# Patient Record
Sex: FEMALE | Race: WHITE
[De-identification: names, ages, dates, MRNs, and addresses within clinical notes are randomized per-mention and may not be internally consistent; named-entity substitution may affect disease eponyms.]

---

## 2018-12-21 ENCOUNTER — HOSPITAL ENCOUNTER (EMERGENCY)
Dept: HOSPITAL 80 - FED | Age: 43
LOS: 1 days | Discharge: HOME | End: 2018-12-22
Payer: COMMERCIAL
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
## 2018-12-21 DIAGNOSIS — S86.812A: Primary | ICD-10-CM
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
## 2018-12-21 PROCEDURE — L4386 NON-PNEUM WALK BOOT PRE CST: HCPCS

## 2018-12-22 VITALS — SYSTOLIC BLOOD PRESSURE: 134 MMHG | DIASTOLIC BLOOD PRESSURE: 96 MMHG

## 2018-12-22 NOTE — EDPHY
H & P


Stated Complaint: 2000: COLLISION WITH OTHER PLAYERS, INCREASING SWELLING/PAIN 

LEFT CALF





- Personal History


LMP (Females 10-55): Hysterectomy


Current Tetanus/Diphtheria Vaccine: Yes





- Medical/Surgical History


Hx Asthma: Yes


Hx Chronic Respiratory Disease: No


Other PMH: HYSTERECTOMY, ENDOMETRIOSIS, CILLIAC , ASTHMA





- Social History


Smoking Status: Never smoked


Time Seen by Provider: 12/21/18 23:34


HPI/ROS: 





Chief complaint:  Left calf pain





History of present illness: This is a 43-year-old female who presents to the 

emergency department for left calf pain.  Patient was playing soccer, she 

collided with another player.  She has developed pain and swelling in the left 

calf at the site of the collision. It makes it difficult to ambulate but she 

still can ambulate. She states she is moving her ankle well.  No report of open 

wounds.  No abnormal coolness or paresthesias in the leg.  No other trauma 

reported. ([place])





- Physical Exam


Exam: 





General:  Alert, nontoxic.


Skin:  No open wounds to the left lower extremity.


Musculoskeletal:  There is mild tenderness to the mid belly of the left calf 

region.  Associated swelling.  However compartments remain soft.  She can move 

all digits in the foot.  She is moving the ankle in all fields well.  The 

Achilles feels intact.  The knee is nontender and she is moving it well.


Vascular:  DP and PT pulses 2+.


Neurologic:  Sensation intact throughout the left lower extremity. ([place] Puente)


Constitutional: 





 Initial Vital Signs











Temperature (C)  36.6 C   12/21/18 23:20


 


Heart Rate  61   12/21/18 23:20


 


Respiratory Rate  18   12/21/18 23:20


 


Blood Pressure  149/62 H  12/21/18 23:20


 


O2 Sat (%)  96   12/21/18 23:20








 











O2 Delivery Mode               Room Air














Allergies/Adverse Reactions: 


 





Sulfa (Sulfonamide Antibiotics) Allergy (Verified 12/21/18 23:19)


 








Home Medications: 














 Medication  Instructions  Recorded


 


Lisinopril  12/21/18














Medical Decision Making





- Diagnostics


Imaging: I viewed and interpreted images myself


ED Course/Re-evaluation: 





Patient seen under the supervision of my secondary supervising physician Dr. Camila Rizzo.  Patient presents for a left calf injury.  She has good 

musculoskeletal control of the knee and ankle.  The leg is neurovascularly 

intact.  The Achilles does appear intact.  Pain and swelling is in the muscle 

belly.  I suspect she has torn part of her calf muscles.  Home care is 

discussed including the use of ibuprofen, ice, elevation.  She is placed in a 

walking boot and given crutches.  She is asked to follow up with Orthopedics 

for recheck and referral information is provided.  Return precautions are 

given.  The patient voiced understanding and agreement plan. ([place] Puente)





PHYSICIAN DOCUMENTATION:


The patient was evaluated and managed by the Physician Assistant.  My co-

signature indicates that I have reviewed this chart and I agree with the 

findings and plan of care as documented.  I am the secondary supervising 

physician.


 (Camila Rizzo)


Differential Diagnosis: 





Included but not limited to contusion, hematoma, muscle tear, Achilles tendon 

rupture, fracture ([place])





Departure





- Departure


Disposition: Home, Routine, Self-Care


Clinical Impression: 


 Strain of calf muscle





Condition: Good


Instructions:  Tendon Rupture (ED)


Additional Instructions: 


Follow-up with an orthopedic doctor for continued evaluation and care





Use ibuprofen 600 mg 3 times a day for the next 2-3 days for pain and swelling





Elevate the leg as much as possible





Ice the injury, 20 min on, 3 times daily for the next 3 days





Remain nonweightbearing, use the crutches and to not walk on the foot





If symptoms worsen or new symptoms develop return to the emergency department 

for recheck


Referrals: 


SAMIRA SINGLETARY [Primary Care Provider] - As per Instructions


Jacinta Fisher MD [Medical Doctor] - As per Instructions